# Patient Record
Sex: FEMALE | Race: WHITE | NOT HISPANIC OR LATINO | URBAN - METROPOLITAN AREA
[De-identification: names, ages, dates, MRNs, and addresses within clinical notes are randomized per-mention and may not be internally consistent; named-entity substitution may affect disease eponyms.]

---

## 2018-05-21 ENCOUNTER — EMERGENCY (EMERGENCY)
Facility: HOSPITAL | Age: 33
LOS: 1 days | Discharge: HOME | End: 2018-05-21
Admitting: EMERGENCY MEDICINE
Payer: COMMERCIAL

## 2018-05-21 VITALS
HEART RATE: 86 BPM | RESPIRATION RATE: 16 BRPM | DIASTOLIC BLOOD PRESSURE: 71 MMHG | OXYGEN SATURATION: 100 % | SYSTOLIC BLOOD PRESSURE: 103 MMHG | TEMPERATURE: 98 F

## 2018-05-21 PROCEDURE — 99283 EMERGENCY DEPT VISIT LOW MDM: CPT

## 2018-05-21 RX ORDER — AZTREONAM 2 G
1 VIAL (EA) INJECTION
Qty: 20 | Refills: 0 | OUTPATIENT
Start: 2018-05-21 | End: 2018-05-30

## 2018-05-21 RX ORDER — CEPHALEXIN 500 MG
500 CAPSULE ORAL ONCE
Qty: 0 | Refills: 0 | Status: COMPLETED | OUTPATIENT
Start: 2018-05-21 | End: 2018-05-21

## 2018-05-21 RX ORDER — CEPHALEXIN 500 MG
1 CAPSULE ORAL
Qty: 40 | Refills: 0 | OUTPATIENT
Start: 2018-05-21 | End: 2018-05-30

## 2018-05-21 RX ADMIN — Medication 500 MILLIGRAM(S): at 21:43

## 2018-05-21 RX ADMIN — Medication 60 MILLIGRAM(S): at 21:43

## 2018-05-21 RX ADMIN — Medication 1 TABLET(S): at 21:43

## 2018-05-21 NOTE — ED PROVIDER NOTE - MEDICAL DECISION MAKING DETAILS
Will start on keflex/bactrim. Will give 1 dose of prednisone- pt to take benadryl when she gets home - for itching/allergic rxn. Pt to f/u with En Dwyer

## 2018-05-21 NOTE — ED PROVIDER NOTE - OBJECTIVE STATEMENT
33 y/o F with hx of childhood eczema and localized reaction to bites presents to the ED with suspected insect bite to base of right palm, noticed by patient 2 mornings ago, that has been streaking up the right forearm. Describes it as a burning itch. States she has been getting bitten periodically over the past 1.5 months; has 2 healing bites to lateral aspect of left thigh since 1 month ago. [place] and dermatologist and cleared for bed bugs.

## 2018-05-21 NOTE — ED PROVIDER NOTE - SKIN, MLM
Right anterior thenar eminence with erythematous raised macule to thenar eminence. Warm. +blanching. Nontender. No crepitus. No discharge. No fluctuance. With streaking up radial aspect of wrist that reaches mid-forearm. Nontender.

## 2018-05-22 RX ORDER — CEPHALEXIN 500 MG
1 CAPSULE ORAL
Qty: 40 | Refills: 0 | OUTPATIENT
Start: 2018-05-22 | End: 2018-05-31

## 2018-05-22 RX ORDER — AZTREONAM 2 G
1 VIAL (EA) INJECTION
Qty: 20 | Refills: 0 | OUTPATIENT
Start: 2018-05-22 | End: 2018-05-31

## 2018-05-25 DIAGNOSIS — L03.113 CELLULITIS OF RIGHT UPPER LIMB: ICD-10-CM

## 2019-04-19 ENCOUNTER — EMERGENCY (EMERGENCY)
Facility: HOSPITAL | Age: 34
LOS: 1 days | Discharge: HOME | End: 2019-04-19
Attending: EMERGENCY MEDICINE | Admitting: EMERGENCY MEDICINE
Payer: COMMERCIAL

## 2019-04-19 VITALS
TEMPERATURE: 99 F | DIASTOLIC BLOOD PRESSURE: 65 MMHG | RESPIRATION RATE: 16 BRPM | HEART RATE: 67 BPM | OXYGEN SATURATION: 99 % | SYSTOLIC BLOOD PRESSURE: 115 MMHG

## 2019-04-19 VITALS
RESPIRATION RATE: 17 BRPM | SYSTOLIC BLOOD PRESSURE: 123 MMHG | OXYGEN SATURATION: 100 % | HEART RATE: 77 BPM | TEMPERATURE: 99 F | DIASTOLIC BLOOD PRESSURE: 79 MMHG

## 2019-04-19 LAB
ALBUMIN SERPL ELPH-MCNC: 4 G/DL — SIGNIFICANT CHANGE UP (ref 3.4–5)
ALP SERPL-CCNC: 46 U/L — SIGNIFICANT CHANGE UP (ref 40–120)
ALT FLD-CCNC: 20 U/L — SIGNIFICANT CHANGE UP (ref 12–42)
ANION GAP SERPL CALC-SCNC: 11 MMOL/L — SIGNIFICANT CHANGE UP (ref 9–16)
APPEARANCE UR: CLEAR — SIGNIFICANT CHANGE UP
AST SERPL-CCNC: 18 U/L — SIGNIFICANT CHANGE UP (ref 15–37)
BACTERIA # UR AUTO: ABNORMAL /HPF
BASOPHILS NFR BLD AUTO: 0.7 % — SIGNIFICANT CHANGE UP (ref 0–2)
BILIRUB SERPL-MCNC: 0.5 MG/DL — SIGNIFICANT CHANGE UP (ref 0.2–1.2)
BILIRUB UR-MCNC: NEGATIVE — SIGNIFICANT CHANGE UP
BUN SERPL-MCNC: 15 MG/DL — SIGNIFICANT CHANGE UP (ref 7–23)
CALCIUM SERPL-MCNC: 9 MG/DL — SIGNIFICANT CHANGE UP (ref 8.5–10.5)
CHLORIDE SERPL-SCNC: 105 MMOL/L — SIGNIFICANT CHANGE UP (ref 96–108)
CO2 SERPL-SCNC: 24 MMOL/L — SIGNIFICANT CHANGE UP (ref 22–31)
COLOR SPEC: YELLOW — SIGNIFICANT CHANGE UP
CREAT SERPL-MCNC: 0.9 MG/DL — SIGNIFICANT CHANGE UP (ref 0.5–1.3)
D DIMER BLD IA.RAPID-MCNC: <187 NG/ML DDU — SIGNIFICANT CHANGE UP
DIFF PNL FLD: NEGATIVE — SIGNIFICANT CHANGE UP
EOSINOPHIL NFR BLD AUTO: 2.9 % — SIGNIFICANT CHANGE UP (ref 0–6)
EPI CELLS # UR: ABNORMAL /HPF (ref 0–5)
GLUCOSE SERPL-MCNC: 86 MG/DL — SIGNIFICANT CHANGE UP (ref 70–99)
GLUCOSE UR QL: NEGATIVE — SIGNIFICANT CHANGE UP
HCG SERPL-ACNC: <1 MIU/ML — SIGNIFICANT CHANGE UP
HCG UR QL: NEGATIVE — SIGNIFICANT CHANGE UP
HCT VFR BLD CALC: 43.3 % — SIGNIFICANT CHANGE UP (ref 34.5–45)
HGB BLD-MCNC: 15.1 G/DL — SIGNIFICANT CHANGE UP (ref 11.5–15.5)
IMM GRANULOCYTES NFR BLD AUTO: 0.5 % — SIGNIFICANT CHANGE UP (ref 0–1.5)
KETONES UR-MCNC: NEGATIVE — SIGNIFICANT CHANGE UP
LEUKOCYTE ESTERASE UR-ACNC: ABNORMAL
LIDOCAIN IGE QN: 244 U/L — SIGNIFICANT CHANGE UP (ref 73–393)
LYMPHOCYTES # BLD AUTO: 27.1 % — SIGNIFICANT CHANGE UP (ref 13–44)
MCHC RBC-ENTMCNC: 30.3 PG — SIGNIFICANT CHANGE UP (ref 27–34)
MCHC RBC-ENTMCNC: 34.9 G/DL — SIGNIFICANT CHANGE UP (ref 32–36)
MCV RBC AUTO: 86.9 FL — SIGNIFICANT CHANGE UP (ref 80–100)
MONOCYTES NFR BLD AUTO: 9.6 % — SIGNIFICANT CHANGE UP (ref 2–14)
NEUTROPHILS NFR BLD AUTO: 59.2 % — SIGNIFICANT CHANGE UP (ref 43–77)
NITRITE UR-MCNC: NEGATIVE — SIGNIFICANT CHANGE UP
PH UR: 6.5 — SIGNIFICANT CHANGE UP (ref 5–8)
PLATELET # BLD AUTO: 366 K/UL — SIGNIFICANT CHANGE UP (ref 150–400)
POTASSIUM SERPL-MCNC: 3.6 MMOL/L — SIGNIFICANT CHANGE UP (ref 3.5–5.3)
POTASSIUM SERPL-SCNC: 3.6 MMOL/L — SIGNIFICANT CHANGE UP (ref 3.5–5.3)
PROT SERPL-MCNC: 7.5 G/DL — SIGNIFICANT CHANGE UP (ref 6.4–8.2)
PROT UR-MCNC: NEGATIVE MG/DL — SIGNIFICANT CHANGE UP
RBC # BLD: 4.98 M/UL — SIGNIFICANT CHANGE UP (ref 3.8–5.2)
RBC # FLD: 13 % — SIGNIFICANT CHANGE UP (ref 10.3–14.5)
SODIUM SERPL-SCNC: 140 MMOL/L — SIGNIFICANT CHANGE UP (ref 132–145)
SP GR SPEC: 1.01 — SIGNIFICANT CHANGE UP (ref 1–1.03)
TROPONIN I SERPL-MCNC: <0.017 NG/ML — LOW (ref 0.02–0.06)
UROBILINOGEN FLD QL: 0.2 E.U./DL — SIGNIFICANT CHANGE UP
WBC # BLD: 11.2 K/UL — HIGH (ref 3.8–10.5)
WBC # FLD AUTO: 11.2 K/UL — HIGH (ref 3.8–10.5)
WBC UR QL: ABNORMAL /HPF

## 2019-04-19 PROCEDURE — 99285 EMERGENCY DEPT VISIT HI MDM: CPT | Mod: 25

## 2019-04-19 PROCEDURE — 71046 X-RAY EXAM CHEST 2 VIEWS: CPT | Mod: 26

## 2019-04-19 PROCEDURE — 76705 ECHO EXAM OF ABDOMEN: CPT | Mod: 26

## 2019-04-19 PROCEDURE — 93010 ELECTROCARDIOGRAM REPORT: CPT | Mod: NC

## 2019-04-19 RX ORDER — SUCRALFATE 1 G
1 TABLET ORAL ONCE
Qty: 0 | Refills: 0 | Status: COMPLETED | OUTPATIENT
Start: 2019-04-19 | End: 2019-04-19

## 2019-04-19 RX ORDER — LIDOCAINE 4 G/100G
10 CREAM TOPICAL ONCE
Qty: 0 | Refills: 0 | Status: COMPLETED | OUTPATIENT
Start: 2019-04-19 | End: 2019-04-19

## 2019-04-19 RX ORDER — FAMOTIDINE 10 MG/ML
20 INJECTION INTRAVENOUS ONCE
Qty: 0 | Refills: 0 | Status: COMPLETED | OUTPATIENT
Start: 2019-04-19 | End: 2019-04-19

## 2019-04-19 RX ORDER — SUCRALFATE 1 G
1 TABLET ORAL
Qty: 21 | Refills: 0 | OUTPATIENT
Start: 2019-04-19 | End: 2019-04-25

## 2019-04-19 RX ADMIN — FAMOTIDINE 20 MILLIGRAM(S): 10 INJECTION INTRAVENOUS at 17:15

## 2019-04-19 RX ADMIN — LIDOCAINE 10 MILLILITER(S): 4 CREAM TOPICAL at 17:15

## 2019-04-19 RX ADMIN — Medication 30 MILLILITER(S): at 17:15

## 2019-04-19 RX ADMIN — Medication 1 GRAM(S): at 17:15

## 2019-04-19 NOTE — ED PROVIDER NOTE - CARE PROVIDER_API CALL
Manjeet Berkowitz)  Gastroenterology  [address] [address]  Phone: [phone]  Fax: [phone]  Follow Up Time:     Po Jackson; MBBS)  Internal Medicine  [address] [place] [address]  Phone: [phone]  Fax: [phone]  Follow Up Time:

## 2019-04-19 NOTE — ED PROVIDER NOTE - OBJECTIVE STATEMENT
33 y.o F with PMHx Hashimoto's, migraine with right sided numbness, weakness, and tingling on Indomethacin and childhood asthma (suspects asthma may be coming back) presents to the ED with c/o indigestion with associated upper abdominal discomfort, SOB, chest tightness with deep breathing. Pt recently started a new medication, Indomethacin, for her migraines and notes experiencing the side effects from the medication. Was rx Pantoprazole which did not relieve indigestion. Neurologist advised pt to visit the ED for further evaluation. Denies history or famhx of blood clots. Not on BCP. Denies fever, chills, n/v,  cough.

## 2019-04-19 NOTE — ED ADULT NURSE NOTE - NSIMPLEMENTINTERV_GEN_ALL_ED
Implemented All Universal Safety Interventions:  [place] to call system. Call bell, personal items and telephone within reach. Instruct patient to call for assistance. Room bathroom lighting operational. Non-slip footwear when patient is off stretcher. Physically safe environment: no spills, clutter or unnecessary equipment. Stretcher in lowest position, wheels locked, appropriate side rails in place.

## 2019-04-19 NOTE — ED PROVIDER NOTE - CLINICAL SUMMARY MEDICAL DECISION MAKING FREE TEXT BOX
33 y.o M presents to the ED with indigestion secondary to starting indomethacin for migraine. Will check blood work including Trop, D-dimer, check liver and pancreas enzymes. Will check CXR and US. Will give GI cocktail and reassess.

## 2019-04-19 NOTE — ED PROVIDER NOTE - PROGRESS NOTE DETAILS
normal trop, EKG, dimer and CXR. most likely refered pain from esophageal/stomach etiology, GERD vs PUD, will start carafate. Explained importance of f/u w GI, d/c indomethacin

## 2019-04-19 NOTE — ED ADULT TRIAGE NOTE - CHIEF COMPLAINT QUOTE
Indigestion and shortness of breath x 2 days. Pt started Indocin on Tuesday and had side effect of indigestion. Began Protonix with no improvement. Denies any chest pain, N/V, fever or chills.

## 2019-04-20 PROBLEM — L30.9 DERMATITIS, UNSPECIFIED: Chronic | Status: ACTIVE | Noted: 2018-05-21

## 2019-04-23 DIAGNOSIS — K30 FUNCTIONAL DYSPEPSIA: ICD-10-CM

## 2019-04-23 DIAGNOSIS — Z79.2 LONG TERM (CURRENT) USE OF ANTIBIOTICS: ICD-10-CM

## 2019-04-23 DIAGNOSIS — R07.89 OTHER CHEST PAIN: ICD-10-CM

## 2019-04-23 DIAGNOSIS — R06.02 SHORTNESS OF BREATH: ICD-10-CM

## 2019-04-23 DIAGNOSIS — R10.13 EPIGASTRIC PAIN: ICD-10-CM

## 2020-06-25 PROBLEM — G43.909 MIGRAINE, UNSPECIFIED, NOT INTRACTABLE, WITHOUT STATUS MIGRAINOSUS: Chronic | Status: ACTIVE | Noted: 2019-04-19

## 2020-07-06 DIAGNOSIS — Z00.00 ENCOUNTER FOR GENERAL ADULT MEDICAL EXAMINATION W/OUT ABNORMAL FINDINGS: ICD-10-CM

## 2020-07-07 ENCOUNTER — APPOINTMENT (OUTPATIENT)
Dept: BREAST CENTER | Facility: CLINIC | Age: 35
End: 2020-07-07
Payer: COMMERCIAL

## 2020-07-07 VITALS — BODY MASS INDEX: 28.85 KG/M2 | WEIGHT: 169 LBS | HEIGHT: 64 IN | HEART RATE: 72 BPM | OXYGEN SATURATION: 97 %

## 2020-07-07 DIAGNOSIS — Z80.0 FAMILY HISTORY OF MALIGNANT NEOPLASM OF DIGESTIVE ORGANS: ICD-10-CM

## 2020-07-07 DIAGNOSIS — Z78.9 OTHER SPECIFIED HEALTH STATUS: ICD-10-CM

## 2020-07-07 DIAGNOSIS — E06.3 AUTOIMMUNE THYROIDITIS: ICD-10-CM

## 2020-07-07 DIAGNOSIS — G43.909 MIGRAINE, UNSPECIFIED, NOT INTRACTABLE, W/OUT STATUS MIGRAINOSUS: ICD-10-CM

## 2020-07-07 DIAGNOSIS — F41.9 ANXIETY DISORDER, UNSPECIFIED: ICD-10-CM

## 2020-07-07 PROCEDURE — 99204 OFFICE O/P NEW MOD 45 MIN: CPT

## 2020-07-08 PROBLEM — Z80.0 FAMILY HISTORY OF COLON CANCER: Status: ACTIVE | Noted: 2020-07-07

## 2020-07-08 PROBLEM — Z78.9 DOES NOT USE TOBACCO: Status: ACTIVE | Noted: 2020-07-07

## 2020-07-08 PROBLEM — Z78.9 SOCIAL ALCOHOL USE: Status: ACTIVE | Noted: 2020-07-07

## 2020-07-08 NOTE — HISTORY OF PRESENT ILLNESS
[FreeTextEntry1] : 33 y/o pre-menopausal female, referred by Dr. Anna Rosen, presents c/o left breast mass for 1-2 months. Denies any tenderness but does note some occasional burning and aching pain in the left breast over the last 3 months. She noticed some redness around the areola that started around the same time she felt the lump. \par \par No family hx of breast cancer. MICHELE 15.2%

## 2020-07-08 NOTE — ASSESSMENT
[FreeTextEntry1] : Anna is a 33 yo female with c/o left breast lump and redness around he areola. Breast imaging was benign, no findings to correlate palpable findings. On exam, there are no discrete masses and no redness or ulcerations noted to the skin, including around the areola. \par \par Anna was reassured that what she is feeling is likely dense fibrocystic tissue, and does not show any evidence of cancer. She was instructed to RTC in 3 months, as which point we can do an in office breast US in addition to a breast exam.

## 2020-07-08 NOTE — PHYSICAL EXAM
[Supple] : supple [No Supraclavicular Adenopathy] : no supraclavicular adenopathy [No Thyromegaly] : no thyromegaly [Examined in the supine and seated position] : examined in the supine and seated position [Symmetrical] : symmetrical [No dominant masses] : no dominant masses in right breast  [No dominant masses] : no dominant masses left breast [No Nipple Retraction] : no right nipple retraction [No Nipple Discharge] : no left nipple discharge [Breast Nipple Inversion] : nipples not inverted [Breast Nipple Retraction] : nipples not retracted [Breast Nipple Flattening] : nipples not flattened [Breast Nipple Fissures] : nipples not fissured [No Axillary Lymphadenopathy] : no left axillary lymphadenopathy [No Edema] : no edema [No Rashes] : no rashes [No Ulceration] : no ulceration

## 2020-07-08 NOTE — PAST MEDICAL HISTORY
[Menarche Age ____] : age at menarche was [unfilled] [Menstruating] : The patient is menstruating [Definite ___ (Date)] : the last menstrual period was [unfilled] [Regular Cycle Intervals] : have been regular [Total Preg ___] : G[unfilled] [History of Hormone Replacement Treatment] : has no history of hormone replacement treatment [FreeTextEntry5] : None [FreeTextEntry6] : None [FreeTextEntry7] : Used for about 10 years in her late teens to early 20s. Stopped about 8 years ago in 2012. [FreeTextEntry8] : None

## 2020-10-21 ENCOUNTER — APPOINTMENT (OUTPATIENT)
Dept: BREAST CENTER | Facility: CLINIC | Age: 35
End: 2020-10-21
Payer: COMMERCIAL

## 2020-10-21 VITALS — HEART RATE: 64 BPM | HEIGHT: 64 IN | BODY MASS INDEX: 28.85 KG/M2 | WEIGHT: 169 LBS | OXYGEN SATURATION: 98 %

## 2020-10-21 DIAGNOSIS — N60.12 DIFFUSE CYSTIC MASTOPATHY OF LEFT BREAST: ICD-10-CM

## 2020-10-21 PROCEDURE — 99213 OFFICE O/P EST LOW 20 MIN: CPT

## 2020-10-21 PROCEDURE — 99072 ADDL SUPL MATRL&STAF TM PHE: CPT

## 2020-10-23 NOTE — PROCEDURE
[FreeTextEntry1] : targeted left breast US [FreeTextEntry2] : left UOQ palpable abnormality [FreeTextEntry3] : Technique: a targeted left breast ultrasound was performed with evaluation of the UOQ retroareolar region.\par Findings:\par No suspicious solid or complex cystic masses were detected \par \par \par

## 2020-10-23 NOTE — PAST MEDICAL HISTORY
[Menstruating] : The patient is menstruating [Menarche Age ____] : age at menarche was [unfilled] [Definite ___ (Date)] : the last menstrual period was [unfilled] [Regular Cycle Intervals] : have been regular [Total Preg ___] : G[unfilled] [History of Hormone Replacement Treatment] : has no history of hormone replacement treatment [FreeTextEntry5] : None [FreeTextEntry6] : None [FreeTextEntry7] : Used for about 10 years in her late teens to early 20s. Stopped about 8 years ago in 2012. [FreeTextEntry8] : None

## 2020-10-23 NOTE — ASSESSMENT
[FreeTextEntry1] : Anna is a 35 yo female with c/o left breast lump and redness around he areola. Breast imaging was benign, no findings to correlate palpable findings. On exam, there are no discrete masses and no redness or ulcerations noted to the skin, including around the areola. \par \par Anna was reassured that what she is feeling is likely dense fibrocystic tissue, and does not show any evidence of cancer. She was instructed to RTC prn.

## 2020-10-23 NOTE — HISTORY OF PRESENT ILLNESS
[FreeTextEntry1] : 33 y/o pre-menopausal female presents for follow up evaluation of palpable left breast abnormality first noted 4-5 months, presumably fibrocystic tissue. \par \par Approximately 3 weeks ago she noted tenderness and left breast swelling which lasted about a week. This occurred the week after her period. She denies any skin changes. \par MICHELE 15.2%

## 2020-10-23 NOTE — PHYSICAL EXAM
[Supple] : supple [No Supraclavicular Adenopathy] : no supraclavicular adenopathy [No Thyromegaly] : no thyromegaly [Examined in the supine and seated position] : examined in the supine and seated position [Symmetrical] : symmetrical [No dominant masses] : no dominant masses in right breast  [No dominant masses] : no dominant masses left breast [No Nipple Retraction] : no left nipple retraction [No Nipple Discharge] : no left nipple discharge [No Axillary Lymphadenopathy] : no left axillary lymphadenopathy [No Edema] : no edema [No Rashes] : no rashes [No Ulceration] : no ulceration [Breast Nipple Inversion] : nipples not inverted [Breast Nipple Retraction] : nipples not retracted [Breast Nipple Flattening] : nipples not flattened [Breast Nipple Fissures] : nipples not fissured

## 2021-04-21 ENCOUNTER — NON-APPOINTMENT (OUTPATIENT)
Age: 36
End: 2021-04-21

## 2021-04-27 ENCOUNTER — NON-APPOINTMENT (OUTPATIENT)
Age: 36
End: 2021-04-27

## 2021-04-28 ENCOUNTER — APPOINTMENT (OUTPATIENT)
Dept: BREAST CENTER | Facility: CLINIC | Age: 36
End: 2021-04-28
Payer: COMMERCIAL

## 2021-04-28 PROCEDURE — 99213 OFFICE O/P EST LOW 20 MIN: CPT | Mod: 25

## 2021-04-28 PROCEDURE — 99072 ADDL SUPL MATRL&STAF TM PHE: CPT

## 2021-04-28 PROCEDURE — 76642 ULTRASOUND BREAST LIMITED: CPT

## 2021-05-04 ENCOUNTER — NON-APPOINTMENT (OUTPATIENT)
Age: 36
End: 2021-05-04

## 2021-05-04 NOTE — PAST MEDICAL HISTORY
[Menstruating] : The patient is menstruating [Menarche Age ____] : age at menarche was [unfilled] [Definite ___ (Date)] : the last menstrual period was [unfilled] [Regular Cycle Intervals] : have been regular [Total Preg ___] : G[unfilled] [History of Hormone Replacement Treatment] : has no history of hormone replacement treatment [FreeTextEntry5] : None [FreeTextEntry7] : Used for about 10 years in her late teens to early 20s. Stopped about 8 years ago in 2012. [FreeTextEntry6] : None [FreeTextEntry8] : None

## 2021-05-04 NOTE — HISTORY OF PRESENT ILLNESS
[FreeTextEntry1] : 36 y/o pre-menopausal female LOV 10/21/20 presents for evaluation of L breast discoloration which began about 2-3 weeks ago and has mostly resolved since.She initially thought it was a bruise but denies any trauma to the area. She also notes an increasing number of red spots on the same breast. She is concerned because this is the breast in which she had previously noted a palpable abnormality. She still notes tenderness in the area of concern.\par \par Of note she recently got her COVID-19 vaccine in her left arm (2nd dose was 3 days ago)

## 2021-05-04 NOTE — PHYSICAL EXAM
[Supple] : supple [No Supraclavicular Adenopathy] : no supraclavicular adenopathy [No Thyromegaly] : no thyromegaly [Examined in the supine and seated position] : examined in the supine and seated position [Symmetrical] : symmetrical [No dominant masses] : no dominant masses in right breast  [No dominant masses] : no dominant masses left breast [No Nipple Retraction] : no left nipple retraction [No Nipple Discharge] : no left nipple discharge [No Axillary Lymphadenopathy] : no left axillary lymphadenopathy [No Edema] : no edema [No Rashes] : no rashes [No Ulceration] : no ulceration [Breast Nipple Inversion] : nipples not inverted [Breast Nipple Retraction] : nipples not retracted [Breast Nipple Flattening] : nipples not flattened [Breast Nipple Fissures] : nipples not fissured [de-identified] : several small probable cherry hemangiomas noted throughout the skin of the breast - these are similar to those also noted elsewhere on the chest and on the contralateral breast, no noticeable skin discoloration in the area noted by patient.

## 2021-05-07 ENCOUNTER — RESULT REVIEW (OUTPATIENT)
Age: 36
End: 2021-05-07

## 2021-07-09 ENCOUNTER — NON-APPOINTMENT (OUTPATIENT)
Age: 36
End: 2021-07-09

## 2021-07-13 ENCOUNTER — NON-APPOINTMENT (OUTPATIENT)
Age: 36
End: 2021-07-13

## 2021-07-14 ENCOUNTER — APPOINTMENT (OUTPATIENT)
Dept: BREAST CENTER | Facility: CLINIC | Age: 36
End: 2021-07-14
Payer: COMMERCIAL

## 2021-07-14 VITALS — OXYGEN SATURATION: 98 % | HEIGHT: 64 IN | BODY MASS INDEX: 28.68 KG/M2 | HEART RATE: 72 BPM | WEIGHT: 168 LBS

## 2021-07-14 PROCEDURE — 99213 OFFICE O/P EST LOW 20 MIN: CPT

## 2021-07-14 PROCEDURE — 99072 ADDL SUPL MATRL&STAF TM PHE: CPT

## 2021-07-19 NOTE — DATA REVIEWED
[FreeTextEntry1] : -- 6/6/2020 (R b/l dx mmg & US: BI-RADS 1. No suspicious findings. \par \par -- 5/3/2021 ([facility]) bilateral breast US showing no suspicious findings, negative study. BIRADS 1

## 2021-07-19 NOTE — HISTORY OF PRESENT ILLNESS
[FreeTextEntry1] : 34 y/o pre-menopausal female LOV 4/28/21 presents for evaluation of L breast discoloration which began about 2-3 weeks prior to LOV which has since resolved. She initially thought it was a bruise but denies any trauma to the area. She is doing well today and has no complaints. \par \par

## 2021-07-19 NOTE — ASSESSMENT
[FreeTextEntry1] : 36 y/o pre-menopausal female LOV 4/28/21 presents for evaluation of L breast discoloration which began about 2-3 weeks prior to LOV which has since resolved. She initially thought it was a bruise but denies any trauma to the area. She is doing well today and has no complaints. \par \par Physical examination WNL. Patient is doing well and can follow up as needed. Patient is to begin screening imaging at the age of 42.

## 2022-01-22 NOTE — ASSESSMENT
[FreeTextEntry1] : 36 y/o pre-menopausal female LOV 10/21/20 presents for evaluation of L breast discoloration which began about 2-3 weeks ago and has mostly resolved since.She initially thought it was a bruise but denies any trauma to the area. She also notes an increasing number of red spots on the same breast. She is concerned because this is the breast in which she had previously noted a palpable abnormality. She still notes tenderness in the area of concern.\par \par Physical examination reveals several small probable cherry hemangiomas noted throughout the skin of the breast - these are similar to those also noted elsewhere on the chest and on the contralateral breast, no noticeable skin discoloration in the area noted by patient. \par \par Patient was reassured about the probable benign nature of these manifestations. She is to have bilateral sonogram at [facility] soon and is to return in 3months for re-examination. 
Negative

## 2023-01-31 NOTE — ED PROVIDER NOTE - NS ED NOTE AC HIGH RISK COUNTRIES
Referral was sent to [facility] [phone] surgical for RW and commode that were delivered to bedside today./martinee/riya/walker No

## 2023-05-08 PROBLEM — R23.4 BREAST SKIN CHANGES: Status: ACTIVE | Noted: 2021-04-28

## 2023-05-09 ENCOUNTER — APPOINTMENT (OUTPATIENT)
Dept: BREAST CENTER | Facility: CLINIC | Age: 38
End: 2023-05-09
Payer: COMMERCIAL

## 2023-05-09 VITALS
DIASTOLIC BLOOD PRESSURE: 75 MMHG | HEART RATE: 65 BPM | SYSTOLIC BLOOD PRESSURE: 116 MMHG | WEIGHT: 179 LBS | BODY MASS INDEX: 30.56 KG/M2 | HEIGHT: 64 IN

## 2023-05-09 DIAGNOSIS — R23.4 CHANGES IN SKIN TEXTURE: ICD-10-CM

## 2023-05-09 DIAGNOSIS — Z78.9 OTHER SPECIFIED HEALTH STATUS: ICD-10-CM

## 2023-05-09 PROCEDURE — 99213 OFFICE O/P EST LOW 20 MIN: CPT

## 2023-05-09 RX ORDER — CLOBETASOL PROPIONATE 0.5 MG/ML
0.05 SOLUTION TOPICAL
Refills: 0 | Status: COMPLETED | COMMUNITY
Start: 2020-07-03 | End: 2023-05-09

## 2023-05-09 RX ORDER — AMLODIPINE BESYLATE 2.5 MG/1
2.5 TABLET ORAL
Refills: 0 | Status: COMPLETED | COMMUNITY
Start: 2020-01-10 | End: 2023-05-09

## 2023-05-09 RX ORDER — ALPRAZOLAM 2 MG/1
TABLET ORAL
Refills: 0 | Status: COMPLETED | COMMUNITY
End: 2023-05-09

## 2023-05-09 RX ORDER — CLOBETASOL PROPIONATE 0.5 MG/G
0.05 CREAM TOPICAL
Refills: 0 | Status: COMPLETED | COMMUNITY
Start: 2020-01-10 | End: 2023-05-09

## 2023-05-09 RX ORDER — PANTOPRAZOLE 40 MG/1
40 TABLET, DELAYED RELEASE ORAL
Refills: 0 | Status: COMPLETED | COMMUNITY
Start: 2020-03-18 | End: 2023-05-09

## 2023-05-09 RX ORDER — HYDROCORTISONE 25 MG/G
CREAM TOPICAL
Refills: 0 | Status: ACTIVE | COMMUNITY

## 2023-05-09 RX ORDER — LEVOTHYROXINE SODIUM 137 UG/1
TABLET ORAL
Refills: 0 | Status: ACTIVE | COMMUNITY

## 2023-05-09 NOTE — PHYSICAL EXAM
[Supple] : supple [No Supraclavicular Adenopathy] : no supraclavicular adenopathy [Examined in the supine and seated position] : examined in the supine and seated position [Symmetrical] : symmetrical [No dominant masses] : no dominant masses in right breast  [No dominant masses] : no dominant masses left breast [No Nipple Retraction] : no left nipple retraction [No Nipple Discharge] : no left nipple discharge [No Axillary Lymphadenopathy] : no left axillary lymphadenopathy [No Edema] : no edema [No Rashes] : no rashes [No Ulceration] : no ulceration [Breast Nipple Inversion] : nipples not inverted [Breast Nipple Retraction] : nipples not retracted [Breast Nipple Flattening] : nipples not flattened [Breast Nipple Fissures] : nipples not fissured [de-identified] : no erythema

## 2023-05-09 NOTE — ASSESSMENT
[FreeTextEntry1] : 36 y/o pre-menopausal female presents with generalized L breast pain, most noticeable in the upper quadrant radiating to axilla, started in early Feb 2023 and has persisted which led into a rash at the end of April 2023. Patient went to the dermatologist, was told it could be tinea and was prescribed a cream which made it worse. Patient has since been applying topical hydrocortisone twice daily and reports improvement. Of note, patient started IVF process in March 2023. B/L DX MMG/US 3/24/23 BIRADS-2.  \par \par MICHELE 15.2%. Physical examination WNL. Patient is doing well and can follow up as needed. Patient is to begin screening imaging at the age of 42.  Patient verbalizes understanding and is in agreement with the plan.\par \par

## 2023-05-09 NOTE — PAST MEDICAL HISTORY
[Menstruating] : The patient is menstruating [Menarche Age ____] : age at menarche was [unfilled] [Regular Cycle Intervals] : have been regular [Total Preg ___] : G[unfilled] [Definite ___ (Date)] : the last menstrual period was [unfilled] [History of Hormone Replacement Treatment] : has no history of hormone replacement treatment [FreeTextEntry5] : None [FreeTextEntry6] : yes - ivf, egg retrieval [FreeTextEntry7] : yes - Used for about 10 years in her late teens to early 20s. Stopped about 8 years ago in 2012. [FreeTextEntry8] : n/a

## 2023-05-09 NOTE — HISTORY OF PRESENT ILLNESS
[FreeTextEntry1] : 38 y/o pre-menopausal female presents with generalized L breast pain, most noticeable in the upper quadrant radiating to axilla, started in early Feb 2023 and has persisted which led into a rash at the end of April 2023. Patient went to the dermatologist, was told it could be tinea and was prescribed a cream which made it worse. Patient has since been applying topical hydrocortisone twice daily and reports improvement. Of note, patient started IVF process in March 2023. B/L DX MMG/US 3/24/23 BIRADS-2. Denies prior breast surgeries or breast biopsies.\par \par Patient was last seen in July 2021 for evaluation of L breast discoloration which began about 2-3 weeks prior to LOV had had since resolved. She initially thought it was a bruise but denies any trauma to the area. \par   \par Patient denies family history of breast cancer. Denies famhx of ovarian cancer. Patient reports famhx of colon cancer in MGF and PGF, both in their 70s. \par \par MICHELE 15.2%  \par

## 2023-05-09 NOTE — DATA REVIEWED
[FreeTextEntry1] : -- 6/6/2020 (R b/l dx mmg & US: BI-RADS 1. No suspicious findings. \par \par -- 5/3/2021 (R) bilateral breast US showing no suspicious findings, negative study. BIRADS 1 \par \par 3/24/23 (R) B/L DX MMG/US: heterogeneously dense. RIGHT breast 9:00 axis 9cmfn 0.5 x 0.4x 0.3cm benign intramammary lymph node. LEFT breast 3:00 axis 1cmfn 0.4 x 0.2 x 0.3cm cyst. Benign findings. No mammographic or US evidence of malignancy. Clinical assessment and management of the patient's left breast palpable abnormality is recommended. FOLLOW-UP: Clinical correlation and assessment. BIRADS-2: Benign